# Patient Record
Sex: MALE | Race: WHITE | Employment: FULL TIME | URBAN - METROPOLITAN AREA
[De-identification: names, ages, dates, MRNs, and addresses within clinical notes are randomized per-mention and may not be internally consistent; named-entity substitution may affect disease eponyms.]

---

## 2017-11-08 PROBLEM — N48.6 PEYRONIE'S DISEASE: Status: ACTIVE | Noted: 2017-11-08

## 2018-01-11 PROBLEM — N50.89 EPIDIDYMAL MASS: Status: ACTIVE | Noted: 2018-01-11

## 2018-01-20 PROCEDURE — 82533 TOTAL CORTISOL: CPT | Performed by: INTERNAL MEDICINE

## 2018-01-22 PROCEDURE — 82384 ASSAY THREE CATECHOLAMINES: CPT | Performed by: INTERNAL MEDICINE

## 2018-01-22 PROCEDURE — 81003 URINALYSIS AUTO W/O SCOPE: CPT | Performed by: INTERNAL MEDICINE

## 2018-01-22 PROCEDURE — 87086 URINE CULTURE/COLONY COUNT: CPT | Performed by: INTERNAL MEDICINE

## 2018-01-22 PROCEDURE — 36415 COLL VENOUS BLD VENIPUNCTURE: CPT | Performed by: INTERNAL MEDICINE

## 2018-01-22 PROCEDURE — 82570 ASSAY OF URINE CREATININE: CPT | Performed by: INTERNAL MEDICINE

## 2019-01-18 PROBLEM — M54.50 CHRONIC BILATERAL LOW BACK PAIN WITHOUT SCIATICA: Status: ACTIVE | Noted: 2019-01-18

## 2019-01-18 PROBLEM — G89.29 CHRONIC BILATERAL LOW BACK PAIN WITHOUT SCIATICA: Status: ACTIVE | Noted: 2019-01-18

## 2019-05-15 PROBLEM — N43.42 SPERMATOCELE OF EPIDIDYMIS, MULTIPLE: Status: ACTIVE | Noted: 2019-05-15

## 2020-07-23 PROBLEM — M54.16 ACUTE RIGHT LUMBAR RADICULOPATHY: Status: ACTIVE | Noted: 2020-07-23

## 2021-07-21 PROBLEM — N50.3 EPIDIDYMAL CYST: Status: ACTIVE | Noted: 2018-01-11

## 2021-07-21 PROBLEM — L72.9 SCROTAL CYST: Status: ACTIVE | Noted: 2021-07-21

## 2021-09-03 PROBLEM — M54.16 ACUTE RIGHT LUMBAR RADICULOPATHY: Status: RESOLVED | Noted: 2020-07-23 | Resolved: 2021-09-03

## 2021-11-15 PROBLEM — Z87.19 HISTORY OF DIVERTICULITIS: Status: ACTIVE | Noted: 2021-11-15

## 2021-11-15 PROBLEM — R10.32 LEFT LOWER QUADRANT ABDOMINAL PAIN: Status: ACTIVE | Noted: 2021-11-15

## 2023-02-28 ENCOUNTER — LAB ENCOUNTER (OUTPATIENT)
Dept: LAB | Age: 63
End: 2023-02-28
Attending: ORTHOPAEDIC SURGERY
Payer: COMMERCIAL

## 2023-02-28 ENCOUNTER — LAB ENCOUNTER (OUTPATIENT)
Dept: LAB | Facility: REFERENCE LAB | Age: 63
End: 2023-02-28
Attending: ORTHOPAEDIC SURGERY
Payer: COMMERCIAL

## 2023-02-28 DIAGNOSIS — Z01.818 PREOPERATIVE TESTING: ICD-10-CM

## 2023-02-28 LAB
ANTIBODY SCREEN: NEGATIVE
RH BLOOD TYPE: NEGATIVE
RH BLOOD TYPE: NEGATIVE

## 2023-02-28 PROCEDURE — 86901 BLOOD TYPING SEROLOGIC RH(D): CPT

## 2023-02-28 PROCEDURE — 86900 BLOOD TYPING SEROLOGIC ABO: CPT

## 2023-02-28 PROCEDURE — 86850 RBC ANTIBODY SCREEN: CPT

## 2023-02-28 RX ORDER — LORATADINE 10 MG/1
1 CAPSULE, LIQUID FILLED ORAL DAILY
COMMUNITY

## 2023-02-28 RX ORDER — TRIAMCINOLONE ACETONIDE 55 UG/1
2 SPRAY, METERED NASAL DAILY
COMMUNITY

## 2023-03-01 PROBLEM — Z98.1 S/P LUMBAR SPINAL FUSION: Status: ACTIVE | Noted: 2023-03-01

## 2023-03-01 LAB — SARS-COV-2 RNA RESP QL NAA+PROBE: NOT DETECTED

## 2023-03-01 RX ORDER — DIAZEPAM 5 MG/ML
5 INJECTION, SOLUTION INTRAMUSCULAR; INTRAVENOUS ONCE AS NEEDED
Status: CANCELLED | OUTPATIENT
Start: 2023-03-01 | End: 2023-03-01

## 2023-03-01 RX ORDER — HYDROCODONE BITARTRATE AND ACETAMINOPHEN 10; 325 MG/1; MG/1
1 TABLET ORAL EVERY 4 HOURS PRN
Status: CANCELLED | OUTPATIENT
Start: 2023-03-01

## 2023-03-01 RX ORDER — HYDROCODONE BITARTRATE AND ACETAMINOPHEN 10; 325 MG/1; MG/1
2 TABLET ORAL EVERY 4 HOURS PRN
Status: DISCONTINUED | OUTPATIENT
Start: 2023-03-01 | End: 2023-03-05

## 2023-03-01 RX ORDER — MULTIVIT WITH MINERALS/LUTEIN
1000 TABLET ORAL 2 TIMES DAILY
Qty: 60 TABLET | Refills: 0 | Status: SHIPPED | OUTPATIENT
Start: 2023-03-01

## 2023-03-01 RX ORDER — OXYCODONE HYDROCHLORIDE AND ACETAMINOPHEN 5; 325 MG/1; MG/1
1-2 TABLET ORAL EVERY 6 HOURS PRN
Qty: 40 TABLET | Refills: 0 | Status: SHIPPED | OUTPATIENT
Start: 2023-03-01

## 2023-03-01 NOTE — DISCHARGE INSTRUCTIONS
Dr. Serena Starkey:  [id number] [address] to get wet in a shower and gently wash with soap and water two days after surgery. Change your dressing daily. When there is no drainage for two days in a row, you may discontinue the dressing. Walk as much as possible after surgery. No bending, lifting more than 10#, or twisting for 12 weeks. Wear your back brace when out of bed. Take vitamin C 1000 mg twice daily with food for 4 weeks. Take pain medication (Norco) as needed for pain, wean down as you are able. Take tizanidine (Zanaflex) for muscle spasms/pain as needed. Use miralax and colace (over the counter medications) to prevent constipation associated with the narcotic pain meds. Do not take anti-inflammatories (Advil, Motrin, Aleve, ibuprofen) for 8 weeks after surgery. It is ok to start taking prescribed aspirin or blood thinners 5 days after surgery - 3/8/2023. Call Dr. Zuhair Romo office with any concerns:  [phone]. Sometimes managing your health at home requires assistance. A representative from the home health agency will contact you or your family to schedule your first visit.        [address], [address]  Phone: [phone]  Fax: [phone]

## 2023-03-03 ENCOUNTER — APPOINTMENT (OUTPATIENT)
Dept: GENERAL RADIOLOGY | Facility: HOSPITAL | Age: 63
End: 2023-03-03
Attending: ORTHOPAEDIC SURGERY
Payer: COMMERCIAL

## 2023-03-03 ENCOUNTER — ANESTHESIA (OUTPATIENT)
Dept: SURGERY | Facility: HOSPITAL | Age: 63
End: 2023-03-03
Payer: COMMERCIAL

## 2023-03-03 ENCOUNTER — HOSPITAL ENCOUNTER (INPATIENT)
Facility: HOSPITAL | Age: 63
LOS: 2 days | Discharge: HOME | End: 2023-03-05
Attending: ORTHOPAEDIC SURGERY | Admitting: ORTHOPAEDIC SURGERY
Payer: COMMERCIAL

## 2023-03-03 ENCOUNTER — ANESTHESIA EVENT (OUTPATIENT)
Dept: SURGERY | Facility: HOSPITAL | Age: 63
End: 2023-03-03
Payer: COMMERCIAL

## 2023-03-03 DIAGNOSIS — Z01.818 PREOPERATIVE TESTING: Primary | ICD-10-CM

## 2023-03-03 LAB
AMPHET UR QL SCN: NEGATIVE
COCAINE UR QL: NEGATIVE
CREAT UR-SCNC: 251 MG/DL
HCT VFR BLD AUTO: 45.2 %
HGB BLD-MCNC: 14.9 G/DL
MDMA UR QL SCN: NEGATIVE
OXYCODONE UR QL SCN: NEGATIVE

## 2023-03-03 PROCEDURE — 80307 DRUG TEST PRSMV CHEM ANLYZR: CPT | Performed by: HOSPITALIST

## 2023-03-03 PROCEDURE — 80361 OPIATES 1 OR MORE: CPT | Performed by: HOSPITALIST

## 2023-03-03 PROCEDURE — 0SG30A0 FUSION OF LUMBOSACRAL JOINT WITH INTERBODY FUSION DEVICE, ANTERIOR APPROACH, ANTERIOR COLUMN, OPEN APPROACH: ICD-10-PCS | Performed by: ORTHOPAEDIC SURGERY

## 2023-03-03 PROCEDURE — 85014 HEMATOCRIT: CPT | Performed by: PHYSICIAN ASSISTANT

## 2023-03-03 PROCEDURE — 4A11X4G MONITORING OF PERIPHERAL NERVOUS ELECTRICAL ACTIVITY, INTRAOPERATIVE, EXTERNAL APPROACH: ICD-10-PCS | Performed by: ORTHOPAEDIC SURGERY

## 2023-03-03 PROCEDURE — 80346 BENZODIAZEPINES1-12: CPT | Performed by: HOSPITALIST

## 2023-03-03 PROCEDURE — 85018 HEMOGLOBIN: CPT | Performed by: PHYSICIAN ASSISTANT

## 2023-03-03 PROCEDURE — 80349 CANNABINOIDS NATURAL: CPT | Performed by: HOSPITALIST

## 2023-03-03 PROCEDURE — 76000 FLUOROSCOPY <1 HR PHYS/QHP: CPT | Performed by: ORTHOPAEDIC SURGERY

## 2023-03-03 PROCEDURE — 3E0T3BZ INTRODUCTION OF ANESTHETIC AGENT INTO PERIPHERAL NERVES AND PLEXI, PERCUTANEOUS APPROACH: ICD-10-PCS | Performed by: ORTHOPAEDIC SURGERY

## 2023-03-03 PROCEDURE — 0SG30K1 FUSION OF LUMBOSACRAL JOINT WITH NONAUTOLOGOUS TISSUE SUBSTITUTE, POSTERIOR APPROACH, POSTERIOR COLUMN, OPEN APPROACH: ICD-10-PCS | Performed by: ORTHOPAEDIC SURGERY

## 2023-03-03 DEVICE — SCREW ST T30  SPIN ASTRA: Type: IMPLANTABLE DEVICE | Status: FUNCTIONAL

## 2023-03-03 DEVICE — 1.3-6.0MMTICANCLCKGSCRWW/STARD: Type: IMPLANTABLE DEVICE | Status: FUNCTIONAL

## 2023-03-03 DEVICE — BONE GRAFT KIT 7510100 INFUSE X SMALL
Type: IMPLANTABLE DEVICE | Status: FUNCTIONAL
Brand: INFUSE® BONE GRAFT

## 2023-03-03 DEVICE — IMPLANTABLE DEVICE: Type: IMPLANTABLE DEVICE | Status: FUNCTIONAL

## 2023-03-03 DEVICE — 9.8-ASTRATICANNULATEDPOLY: Type: IMPLANTABLE DEVICE | Status: FUNCTIONAL

## 2023-03-03 DEVICE — 9.1-6MMTIMISFIXEDLORDOSE: Type: IMPLANTABLE DEVICE | Status: FUNCTIONAL

## 2023-03-03 DEVICE — 4.1-TIANTEGRA(TM)PLTONELVLSACR: Type: IMPLANTABLE DEVICE | Status: FUNCTIONAL

## 2023-03-03 RX ORDER — DEXAMETHASONE SODIUM PHOSPHATE 4 MG/ML
VIAL (ML) INJECTION AS NEEDED
Status: DISCONTINUED | OUTPATIENT
Start: 2023-03-03 | End: 2023-03-03 | Stop reason: SURG

## 2023-03-03 RX ORDER — ASCORBIC ACID 500 MG
1000 TABLET ORAL 2 TIMES DAILY
Status: DISCONTINUED | OUTPATIENT
Start: 2023-03-03 | End: 2023-03-05

## 2023-03-03 RX ORDER — PROCHLORPERAZINE EDISYLATE 5 MG/ML
5 INJECTION INTRAMUSCULAR; INTRAVENOUS EVERY 8 HOURS PRN
Status: DISCONTINUED | OUTPATIENT
Start: 2023-03-03 | End: 2023-03-03

## 2023-03-03 RX ORDER — ROCURONIUM BROMIDE 10 MG/ML
INJECTION, SOLUTION INTRAVENOUS AS NEEDED
Status: DISCONTINUED | OUTPATIENT
Start: 2023-03-03 | End: 2023-03-03 | Stop reason: SURG

## 2023-03-03 RX ORDER — MELATONIN
100 DAILY
Status: DISCONTINUED | OUTPATIENT
Start: 2023-03-04 | End: 2023-03-05

## 2023-03-03 RX ORDER — ONDANSETRON 2 MG/ML
4 INJECTION INTRAMUSCULAR; INTRAVENOUS EVERY 6 HOURS PRN
Status: DISCONTINUED | OUTPATIENT
Start: 2023-03-03 | End: 2023-03-03

## 2023-03-03 RX ORDER — PROCHLORPERAZINE EDISYLATE 5 MG/ML
5 INJECTION INTRAMUSCULAR; INTRAVENOUS EVERY 8 HOURS PRN
Status: DISCONTINUED | OUTPATIENT
Start: 2023-03-03 | End: 2023-03-05

## 2023-03-03 RX ORDER — DEXAMETHASONE SODIUM PHOSPHATE 4 MG/ML
10 VIAL (ML) INJECTION ONCE
Status: COMPLETED | OUTPATIENT
Start: 2023-03-04 | End: 2023-03-04

## 2023-03-03 RX ORDER — DIPHENHYDRAMINE HYDROCHLORIDE 50 MG/ML
25 INJECTION INTRAMUSCULAR; INTRAVENOUS EVERY 4 HOURS PRN
Status: DISCONTINUED | OUTPATIENT
Start: 2023-03-03 | End: 2023-03-05

## 2023-03-03 RX ORDER — OXYCODONE HYDROCHLORIDE AND ACETAMINOPHEN 5; 325 MG/1; MG/1
2 TABLET ORAL EVERY 4 HOURS PRN
Status: DISCONTINUED | OUTPATIENT
Start: 2023-03-03 | End: 2023-03-05

## 2023-03-03 RX ORDER — LORAZEPAM 1 MG/1
2 TABLET ORAL
Status: DISCONTINUED | OUTPATIENT
Start: 2023-03-03 | End: 2023-03-05

## 2023-03-03 RX ORDER — MORPHINE SULFATE 10 MG/ML
6 INJECTION, SOLUTION INTRAMUSCULAR; INTRAVENOUS EVERY 10 MIN PRN
Status: DISCONTINUED | OUTPATIENT
Start: 2023-03-03 | End: 2023-03-03 | Stop reason: HOSPADM

## 2023-03-03 RX ORDER — SODIUM CHLORIDE, SODIUM LACTATE, POTASSIUM CHLORIDE, CALCIUM CHLORIDE 600; 310; 30; 20 MG/100ML; MG/100ML; MG/100ML; MG/100ML
INJECTION, SOLUTION INTRAVENOUS CONTINUOUS
Status: DISCONTINUED | OUTPATIENT
Start: 2023-03-03 | End: 2023-03-05

## 2023-03-03 RX ORDER — LORAZEPAM 2 MG/ML
1 INJECTION INTRAMUSCULAR
Status: DISCONTINUED | OUTPATIENT
Start: 2023-03-03 | End: 2023-03-05

## 2023-03-03 RX ORDER — CEFAZOLIN SODIUM/WATER 2 G/20 ML
2 SYRINGE (ML) INTRAVENOUS EVERY 8 HOURS
Status: COMPLETED | OUTPATIENT
Start: 2023-03-03 | End: 2023-03-04

## 2023-03-03 RX ORDER — DICYCLOMINE HYDROCHLORIDE 10 MG/1
10 CAPSULE ORAL EVERY 4 HOURS PRN
Status: DISCONTINUED | OUTPATIENT
Start: 2023-03-03 | End: 2023-03-05

## 2023-03-03 RX ORDER — LIDOCAINE HYDROCHLORIDE 10 MG/ML
INJECTION, SOLUTION EPIDURAL; INFILTRATION; INTRACAUDAL; PERINEURAL AS NEEDED
Status: DISCONTINUED | OUTPATIENT
Start: 2023-03-03 | End: 2023-03-03 | Stop reason: SURG

## 2023-03-03 RX ORDER — SENNOSIDES 8.6 MG
17.2 TABLET ORAL NIGHTLY
Status: DISCONTINUED | OUTPATIENT
Start: 2023-03-03 | End: 2023-03-05

## 2023-03-03 RX ORDER — HYDROMORPHONE HYDROCHLORIDE 1 MG/ML
0.4 INJECTION, SOLUTION INTRAMUSCULAR; INTRAVENOUS; SUBCUTANEOUS EVERY 5 MIN PRN
Status: DISCONTINUED | OUTPATIENT
Start: 2023-03-03 | End: 2023-03-03 | Stop reason: HOSPADM

## 2023-03-03 RX ORDER — SODIUM PHOSPHATE, DIBASIC AND SODIUM PHOSPHATE, MONOBASIC 7; 19 G/133ML; G/133ML
1 ENEMA RECTAL ONCE AS NEEDED
Status: DISCONTINUED | OUTPATIENT
Start: 2023-03-03 | End: 2023-03-05

## 2023-03-03 RX ORDER — MULTIPLE VITAMINS W/ MINERALS TAB 9MG-400MCG
1 TAB ORAL DAILY
Status: DISCONTINUED | OUTPATIENT
Start: 2023-03-03 | End: 2023-03-05

## 2023-03-03 RX ORDER — TIZANIDINE 2 MG/1
2 TABLET ORAL EVERY 8 HOURS PRN
Status: DISCONTINUED | OUTPATIENT
Start: 2023-03-03 | End: 2023-03-05

## 2023-03-03 RX ORDER — PANTOPRAZOLE SODIUM 20 MG/1
20 TABLET, DELAYED RELEASE ORAL DAILY PRN
Status: DISCONTINUED | OUTPATIENT
Start: 2023-03-03 | End: 2023-03-05

## 2023-03-03 RX ORDER — ONDANSETRON 2 MG/ML
INJECTION INTRAMUSCULAR; INTRAVENOUS AS NEEDED
Status: DISCONTINUED | OUTPATIENT
Start: 2023-03-03 | End: 2023-03-03 | Stop reason: SURG

## 2023-03-03 RX ORDER — PROCHLORPERAZINE EDISYLATE 5 MG/ML
5 INJECTION INTRAMUSCULAR; INTRAVENOUS EVERY 8 HOURS PRN
Status: DISCONTINUED | OUTPATIENT
Start: 2023-03-03 | End: 2023-03-03 | Stop reason: HOSPADM

## 2023-03-03 RX ORDER — ONDANSETRON 2 MG/ML
4 INJECTION INTRAMUSCULAR; INTRAVENOUS EVERY 6 HOURS PRN
Status: DISCONTINUED | OUTPATIENT
Start: 2023-03-03 | End: 2023-03-03 | Stop reason: HOSPADM

## 2023-03-03 RX ORDER — ACETAMINOPHEN 500 MG
1000 TABLET ORAL ONCE
Status: COMPLETED | OUTPATIENT
Start: 2023-03-03 | End: 2023-03-03

## 2023-03-03 RX ORDER — FLUTICASONE PROPIONATE 50 MCG
2 SPRAY, SUSPENSION (ML) NASAL DAILY
Status: DISCONTINUED | OUTPATIENT
Start: 2023-03-03 | End: 2023-03-05

## 2023-03-03 RX ORDER — HYDROXYZINE HYDROCHLORIDE 25 MG/1
50 TABLET, FILM COATED ORAL EVERY 6 HOURS PRN
Status: DISCONTINUED | OUTPATIENT
Start: 2023-03-03 | End: 2023-03-05

## 2023-03-03 RX ORDER — MORPHINE SULFATE 4 MG/ML
4 INJECTION, SOLUTION INTRAMUSCULAR; INTRAVENOUS EVERY 10 MIN PRN
Status: DISCONTINUED | OUTPATIENT
Start: 2023-03-03 | End: 2023-03-03 | Stop reason: HOSPADM

## 2023-03-03 RX ORDER — CEFAZOLIN SODIUM/WATER 2 G/20 ML
2 SYRINGE (ML) INTRAVENOUS ONCE
Status: COMPLETED | OUTPATIENT
Start: 2023-03-03 | End: 2023-03-03

## 2023-03-03 RX ORDER — SODIUM CHLORIDE, SODIUM LACTATE, POTASSIUM CHLORIDE, CALCIUM CHLORIDE 600; 310; 30; 20 MG/100ML; MG/100ML; MG/100ML; MG/100ML
INJECTION, SOLUTION INTRAVENOUS CONTINUOUS
Status: DISCONTINUED | OUTPATIENT
Start: 2023-03-03 | End: 2023-03-03 | Stop reason: HOSPADM

## 2023-03-03 RX ORDER — FOLIC ACID 1 MG/1
1 TABLET ORAL DAILY
Status: DISCONTINUED | OUTPATIENT
Start: 2023-03-03 | End: 2023-03-05

## 2023-03-03 RX ORDER — SODIUM CHLORIDE 9 MG/ML
INJECTION, SOLUTION INTRAVENOUS CONTINUOUS PRN
Status: DISCONTINUED | OUTPATIENT
Start: 2023-03-03 | End: 2023-03-03 | Stop reason: SURG

## 2023-03-03 RX ORDER — MORPHINE SULFATE 4 MG/ML
2 INJECTION, SOLUTION INTRAMUSCULAR; INTRAVENOUS EVERY 10 MIN PRN
Status: DISCONTINUED | OUTPATIENT
Start: 2023-03-03 | End: 2023-03-03 | Stop reason: HOSPADM

## 2023-03-03 RX ORDER — MIDAZOLAM HYDROCHLORIDE 1 MG/ML
INJECTION INTRAMUSCULAR; INTRAVENOUS AS NEEDED
Status: DISCONTINUED | OUTPATIENT
Start: 2023-03-03 | End: 2023-03-03 | Stop reason: SURG

## 2023-03-03 RX ORDER — HYDROMORPHONE HYDROCHLORIDE 1 MG/ML
0.6 INJECTION, SOLUTION INTRAMUSCULAR; INTRAVENOUS; SUBCUTANEOUS EVERY 5 MIN PRN
Status: DISCONTINUED | OUTPATIENT
Start: 2023-03-03 | End: 2023-03-03 | Stop reason: HOSPADM

## 2023-03-03 RX ORDER — DIAZEPAM 5 MG/1
5 TABLET ORAL EVERY 6 HOURS PRN
Status: DISCONTINUED | OUTPATIENT
Start: 2023-03-03 | End: 2023-03-05

## 2023-03-03 RX ORDER — MORPHINE SULFATE 0.5 MG/ML
INJECTION, SOLUTION EPIDURAL; INTRATHECAL; INTRAVENOUS AS NEEDED
Status: DISCONTINUED | OUTPATIENT
Start: 2023-03-03 | End: 2023-03-03 | Stop reason: HOSPADM

## 2023-03-03 RX ORDER — DIPHENHYDRAMINE HCL 25 MG
25 CAPSULE ORAL EVERY 4 HOURS PRN
Status: DISCONTINUED | OUTPATIENT
Start: 2023-03-03 | End: 2023-03-05

## 2023-03-03 RX ORDER — BISACODYL 10 MG
10 SUPPOSITORY, RECTAL RECTAL
Status: DISCONTINUED | OUTPATIENT
Start: 2023-03-03 | End: 2023-03-05

## 2023-03-03 RX ORDER — SODIUM CHLORIDE 9 MG/ML
INJECTION, SOLUTION INTRAVENOUS CONTINUOUS
Status: DISCONTINUED | OUTPATIENT
Start: 2023-03-03 | End: 2023-03-05

## 2023-03-03 RX ORDER — LORAZEPAM 1 MG/1
1 TABLET ORAL
Status: DISCONTINUED | OUTPATIENT
Start: 2023-03-03 | End: 2023-03-05

## 2023-03-03 RX ORDER — NALOXONE HYDROCHLORIDE 0.4 MG/ML
80 INJECTION, SOLUTION INTRAMUSCULAR; INTRAVENOUS; SUBCUTANEOUS AS NEEDED
Status: DISCONTINUED | OUTPATIENT
Start: 2023-03-03 | End: 2023-03-03 | Stop reason: HOSPADM

## 2023-03-03 RX ORDER — BUPIVACAINE HYDROCHLORIDE AND EPINEPHRINE 5; 5 MG/ML; UG/ML
INJECTION, SOLUTION PERINEURAL AS NEEDED
Status: DISCONTINUED | OUTPATIENT
Start: 2023-03-03 | End: 2023-03-03 | Stop reason: HOSPADM

## 2023-03-03 RX ORDER — HYDROMORPHONE HYDROCHLORIDE 1 MG/ML
0.3 INJECTION, SOLUTION INTRAMUSCULAR; INTRAVENOUS; SUBCUTANEOUS
Status: DISCONTINUED | OUTPATIENT
Start: 2023-03-03 | End: 2023-03-05

## 2023-03-03 RX ORDER — DEXAMETHASONE SODIUM PHOSPHATE 4 MG/ML
10 VIAL (ML) INJECTION ONCE
Status: DISCONTINUED | OUTPATIENT
Start: 2023-03-03 | End: 2023-03-03

## 2023-03-03 RX ORDER — POLYETHYLENE GLYCOL 3350 17 G/17G
17 POWDER, FOR SOLUTION ORAL DAILY PRN
Status: DISCONTINUED | OUTPATIENT
Start: 2023-03-03 | End: 2023-03-05

## 2023-03-03 RX ORDER — ONDANSETRON 2 MG/ML
4 INJECTION INTRAMUSCULAR; INTRAVENOUS EVERY 6 HOURS PRN
Status: DISCONTINUED | OUTPATIENT
Start: 2023-03-03 | End: 2023-03-05

## 2023-03-03 RX ORDER — NALOXONE HYDROCHLORIDE 0.4 MG/ML
0.08 INJECTION, SOLUTION INTRAMUSCULAR; INTRAVENOUS; SUBCUTANEOUS
Status: DISCONTINUED | OUTPATIENT
Start: 2023-03-03 | End: 2023-03-05

## 2023-03-03 RX ORDER — EPHEDRINE SULFATE 50 MG/ML
INJECTION INTRAVENOUS AS NEEDED
Status: DISCONTINUED | OUTPATIENT
Start: 2023-03-03 | End: 2023-03-03 | Stop reason: SURG

## 2023-03-03 RX ORDER — LORAZEPAM 2 MG/ML
2 INJECTION INTRAMUSCULAR
Status: DISCONTINUED | OUTPATIENT
Start: 2023-03-03 | End: 2023-03-05

## 2023-03-03 RX ORDER — VANCOMYCIN HYDROCHLORIDE 1 G/20ML
INJECTION, POWDER, LYOPHILIZED, FOR SOLUTION INTRAVENOUS AS NEEDED
Status: DISCONTINUED | OUTPATIENT
Start: 2023-03-03 | End: 2023-03-03 | Stop reason: HOSPADM

## 2023-03-03 RX ORDER — HYDROMORPHONE HYDROCHLORIDE 1 MG/ML
0.2 INJECTION, SOLUTION INTRAMUSCULAR; INTRAVENOUS; SUBCUTANEOUS EVERY 5 MIN PRN
Status: DISCONTINUED | OUTPATIENT
Start: 2023-03-03 | End: 2023-03-03 | Stop reason: HOSPADM

## 2023-03-03 RX ORDER — OXYCODONE HYDROCHLORIDE AND ACETAMINOPHEN 5; 325 MG/1; MG/1
1 TABLET ORAL EVERY 4 HOURS PRN
Status: DISCONTINUED | OUTPATIENT
Start: 2023-03-03 | End: 2023-03-05

## 2023-03-03 RX ADMIN — SODIUM CHLORIDE, SODIUM LACTATE, POTASSIUM CHLORIDE, CALCIUM CHLORIDE: 600; 310; 30; 20 INJECTION, SOLUTION INTRAVENOUS at 11:30:00

## 2023-03-03 RX ADMIN — MIDAZOLAM HYDROCHLORIDE 2 MG: 1 INJECTION INTRAMUSCULAR; INTRAVENOUS at 08:32:00

## 2023-03-03 RX ADMIN — CEFAZOLIN SODIUM/WATER 2 G: 2 G/20 ML SYRINGE (ML) INTRAVENOUS at 08:49:00

## 2023-03-03 RX ADMIN — DEXAMETHASONE SODIUM PHOSPHATE 4 MG: 4 MG/ML VIAL (ML) INJECTION at 09:56:00

## 2023-03-03 RX ADMIN — ROCURONIUM BROMIDE 50 MG: 10 INJECTION, SOLUTION INTRAVENOUS at 08:40:00

## 2023-03-03 RX ADMIN — DEXAMETHASONE SODIUM PHOSPHATE 6 MG: 4 MG/ML VIAL (ML) INJECTION at 10:30:00

## 2023-03-03 RX ADMIN — EPHEDRINE SULFATE 5 MG: 50 INJECTION INTRAVENOUS at 11:15:00

## 2023-03-03 RX ADMIN — ROCURONIUM BROMIDE 20 MG: 10 INJECTION, SOLUTION INTRAVENOUS at 09:15:00

## 2023-03-03 RX ADMIN — SODIUM CHLORIDE: 9 INJECTION, SOLUTION INTRAVENOUS at 08:44:00

## 2023-03-03 RX ADMIN — ONDANSETRON 4 MG: 2 INJECTION INTRAMUSCULAR; INTRAVENOUS at 09:56:00

## 2023-03-03 RX ADMIN — ROCURONIUM BROMIDE 30 MG: 10 INJECTION, SOLUTION INTRAVENOUS at 08:56:00

## 2023-03-03 RX ADMIN — LIDOCAINE HYDROCHLORIDE 50 MG: 10 INJECTION, SOLUTION EPIDURAL; INFILTRATION; INTRACAUDAL; PERINEURAL at 08:38:00

## 2023-03-03 NOTE — ANESTHESIA PROCEDURE NOTES
Airway  Date/Time: 3/3/2023 8:41 AM  Urgency: Elective    Airway not difficult    General Information and Staff    Patient location during procedure: OR  Anesthesiologist: Jocelyn Carter MD  Resident/CRNA: Wenceslao Perea CRNA  Performed: CRNA   Performed by: Wenceslao Perea CRNA  Authorized by: Jocelyn Carter MD      Indications and Patient Condition  Indications for airway management: anesthesia  Sedation level: deep  Preoxygenated: yes  Patient position: sniffing  Mask difficulty assessment: 1 - vent by mask    Final Airway Details  Final airway type: endotracheal airway      Successful airway: ETT  Cuffed: yes   Successful intubation technique: Video laryngoscopy  Facilitating devices/methods: intubating stylet and cricoid pressure  Endotracheal tube insertion site: oral  Blade: GlideScope  Blade size: #3  ETT size (mm): 7.5    Cormack-Lehane Classification: grade I - full view of glottis  Placement verified by: chest auscultation, capnometry and palpation of cuff   Measured from: teeth  ETT to teeth (cm): 22  Number of attempts at approach: 1

## 2023-03-03 NOTE — OPERATIVE REPORT
[facility]    PATIENT'S NAME: Charlotte KEARNEY   ATTENDING PHYSICIAN: Anya Oneill. Ole Hill MD   OPERATING PHYSICIAN: Anya Oneill. Ole Hill MD   PATIENT ACCOUNT#:   [id number]    LOCATION:  [facility] [address] PACU [address] [place] [address]  MEDICAL RECORD #:   [id number]       YOB: 1960  ADMISSION DATE:       03/03/2023      OPERATION DATE:  03/03/2023    OPERATIVE REPORT      PREOPERATIVE DIAGNOSIS:  Degenerative disc disease, L5-S1, with retrolisthesis. POSTOPERATIVE DIAGNOSIS:  Degenerative disc disease, L5-S1, with retrolisthesis. PROCEDURE:    1. Anterior lumbar interbody fusion, L5-S1 (42602-69). 2.   DePuy ATB plate fixation system (87471, plate not connected to cage). 3.   Interbody cage, L5-S1 (independent of plate, 57936). 4.   Posterior spinal fusion, L5-S1 (56777). 5.   SpineCraft [place] pedicle screw system, L5-S1 (33576). 6.   Intrathecal morphine and fentanyl block for postoperative analgesia. CO-SURGEON:  Hermila Garza MD. (for exposure of anterior fusion and performance of interbody fusion). ASSISTANT:  Willow Hernandez PA-C (for posterior procedures, medically necessary). ANESTHESIA:  General endotracheal.    ESTIMATED BLOOD LOSS:  20 mL. DRAINS:  None. COMPLICATIONS:  None. INDICATIONS:  This is a 60-year-old male with a long history of chronic low back pain, unresponsive to conservative treatment. MRI reveals severe degenerative disc disease at L5-S1. I have recommended anterior and posterior lumbar interbody fusion, L5-S1. Risks, benefits, and alternatives were explained to the patient in detail. He appears to understand and has elected to proceed. OPERATIVE TECHNIQUE:  The patient was given uncomplicated general endotracheal anesthesia and placed supine upon the operating room table. The abdomen was prepped and draped in the usual fashion. He was given preoperative antibiotics and IV steroids. Anterior exposure was made by Dr. Hermila Garza, Department of General Surgery. This was a transverse incision approximately 4 inches below the umbilicus. Once the L5-S1 disc was exposed and the bifurcation of the great vessels and retractor holding the vessels out of the way, a needle was placed in the L5-S1 disc space and confirmed position by AP and lateral fluoroscopy. The anterior annulus was then incised with a #15 blade. The disc space was heavily curetted out. It was severely degenerative. The bone did appear to be slightly softer than normal for a male his age, but still adequate for fixation. We curetted the degenerative disc (the degeneration was severe) all the way back to the posterior annulus. We then trialed different sizes of DePuy interbody cages, finally settling on a 15 mm machined cage. We filled this with 1 sponge of Infuse and also MagnetOs calcium graft. This was driven into place and additional MagnetOs graft placed in front of the cage. A 41 mm DePuy titanium ATB plate was then affixed to the spine using 26 mm screws. Fixation was excellent. Final x-ray showed excellent placement of the plate and cage at W4-J7 with excellent restoration of normal disc height and excellent re-creation of normal lumbar lordosis. Final tightening performed on every screw. No complications. Closure was made uneventfully by Dr. Charis Reardon. Once the anterior incision was closed, a sterile dry dressing was applied. The patient was then placed prone on a Bryant frame for the remainder of the procedure. The face and extremities were well padded. The back was prepped and draped in the usual fashion. Using multiplanar fluoroscopy, two 1-inch incisions were made approximately 3.5 cm lateral to midline for a standard Dre approach down to the L5-S1 facet joints. These were decorticated using a narrow Le[place]ell. These were packed with MagnetOs graft, and then we placed Jamshidi needles in the L5 and S1 pedicles under direct fluoroscopy.   Each pedicle tapped with triggered EMGs and noted to be within threshold limits. SpineCraft [place] pedicle screws were then placed at L5 and S1 with adequate fixation. All screws tested with triggered EMGs and noted again to be within threshold limits. Contoured rods were then tightened down. Final torque wrench applied to every set screw. Final x-ray shows showed excellent placement of the pedicle screws in the anterior plate and anterior cage. An intrathecal morphine and fentanyl block was performed with a 27-gauge Sprotte needle, 0.5 mg morphine and 25 mcg fentanyl, for postop analgesia. The incisions were irrigated and closed over vancomycin powder, 500 mg, bilaterally. Subcutaneous tissue closed with 2-0 PDS. Skin closed with 4-0 Monocryl and Dermabond. A sterile dry dressing was applied. The patient tolerated the procedure well, was extubated and taken to the recovery room with stable blood pressure and pulse. There were no complications. SCDs and TEDs were used throughout the case. SSEP was unchanged during the entire procedure. Dictated By Jose Lott MD  d: 03/03/2023 11:53:21  t: 03/03/2023 13:44:15  [facility] [id number]  TOÑO/

## 2023-03-03 NOTE — PLAN OF CARE
Problem: PAIN - ADULT  Goal: Verbalizes/displays adequate comfort level or patient's stated pain goal  Description: INTERVENTIONS:  - Encourage pt to monitor pain and request assistance  - Assess pain using appropriate pain scale  - Administer analgesics based on type and severity of pain and evaluate response  - Implement non-pharmacological measures as appropriate and evaluate response  - Consider cultural and social influences on pain and pain management  - Manage/alleviate anxiety  - Utilize distraction and/or relaxation techniques  - Monitor for opioid side effects  - Notify MD/LIP if interventions unsuccessful or patient reports new pain  - Anticipate increased pain with activity and pre-medicate as appropriate  Outcome: Progressing     Problem: SAFETY ADULT - FALL  Goal: Free from fall injury  Description: INTERVENTIONS:  - Assess pt frequently for physical needs  - Identify cognitive and physical deficits and behaviors that affect risk of falls.   - [place] fall precautions as indicated by assessment.  - Educate pt/family on patient safety including physical limitations  - Instruct pt to call for assistance with activity based on assessment  - Modify environment to reduce risk of injury  - Provide assistive devices as appropriate  - Consider OT/PT consult to assist with strengthening/mobility  - Encourage toileting schedule  Outcome: Progressing     Problem: DISCHARGE PLANNING  Goal: Discharge to home or other facility with appropriate resources  Description: INTERVENTIONS:  - Identify barriers to discharge w/pt and caregiver  - Include patient/family/discharge partner in discharge planning  - Arrange for needed discharge resources and transportation as appropriate  - Identify discharge learning needs (meds, wound care, etc)  - Arrange for interpreters to assist at discharge as needed  - Consider post-discharge preferences of patient/family/discharge partner  - Complete POLST form as appropriate  - Assess patient's ability to be responsible for managing their own health  - Refer to Case Management Department for coordinating discharge planning if the patient needs post-hospital services based on physician/LIP order or complex needs related to functional status, cognitive ability or social support system  Outcome: Progressing     Problem: DRUG ABUSE/DETOX  Goal: Will have no detox symptoms and will verbalize plan for changing drug-related behavior  Description: INTERVENTIONS:  - Administer medication as ordered  - Monitor physical status  - Provide emotional support with 1:1 interaction with staff  - Encourage 12-Step involvement or recovery focused alternative  Outcome: Progressing     Problem: SKIN/TISSUE INTEGRITY - ADULT  Goal: Incision(s), wounds(s) or drain site(s) healing without S/S of infection  Description: INTERVENTIONS:  - Assess and document risk factors for pressure ulcer development  - Assess and document skin integrity  - Assess and document dressing/incision, wound bed, drain sites and surrounding tissue  - Implement wound care per orders  - Initiate isolation precautions as appropriate  - Initiate Pressure Ulcer prevention bundle as indicated  Outcome: Progressing     Problem: MUSCULOSKELETAL - ADULT  Goal: Maintain proper alignment of affected body part  Description: INTERVENTIONS:  - Support and protect limb and body alignment per provider's orders  - Instruct and reinforce with patient and family use of appropriate assistive device and precautions (e.g. spinal or hip dislocation precautions)  Outcome: Progressing    Received pt from recovery room alert and oriented. Dressing to anterior lower abdomen and lower back clean, dry and intact. Pt's diaphoretic. Nausea reported. VSS. Patient stated that his last drink was 2 weeks ago and did marijuana yesterday. Md fitzpatrick and GREG started. Medicated for c/o severe headache. Ice gel to lower back maintained. Dudley cath in place.  Clear liquid diet until BS present and (+) flatus. Fall precautions in place.

## 2023-03-03 NOTE — OPERATIVE REPORT
Pre-postop dx:  Lumbar DDD L5-S1  Proc:  alif L5-S1 with depuy plate, cage, PSF Q0-C8 with spinecraft char[place] screw system, intrathecal block, Magnetos graft  Carlos/Rani (for alif) and BRII Muñiz (posterior procedures)  geta   Ebl: 20 ml  Drains: none  Complications;  None  To RR stable  #[id number]

## 2023-03-04 PROCEDURE — 97530 THERAPEUTIC ACTIVITIES: CPT

## 2023-03-04 PROCEDURE — 97166 OT EVAL MOD COMPLEX 45 MIN: CPT

## 2023-03-04 PROCEDURE — 97162 PT EVAL MOD COMPLEX 30 MIN: CPT

## 2023-03-04 PROCEDURE — 97535 SELF CARE MNGMENT TRAINING: CPT

## 2023-03-04 NOTE — PLAN OF CARE
Pt is A&Ox4. RA. CIWA scale. Remote tele. IVF running. Tolerating clear liquid diet. Ice therapy. Voiding via joseph. PRN percocet & Ativan for pain and agitation. Ice gel theraphy. Call light within reach, frequent rounding. Safety measures in place.      Problem: Patient Centered Care  Goal: Patient preferences are identified and integrated in the patient's plan of care  Description: Interventions:  - What would you like us to know as we care for you?   - Provide timely, complete, and accurate information to patient/family  - Incorporate patient and family knowledge, values, beliefs, and cultural backgrounds into the planning and delivery of care  - Encourage patient/family to participate in care and decision-making at the level they choose  - Honor patient and family perspectives and choices  Outcome: Progressing     Problem: Patient/Family Goals  Goal: Patient/Family Long Term Goal  Description: Patient's Long Term Goal:     Interventions:  -   - See additional Care Plan goals for specific interventions  Outcome: Progressing  Goal: Patient/Family Short Term Goal  Description: Patient's Short Term Goal:     Interventio  - See additional Care Plan goals for specific interventions  Outcome: Progressing     Problem: PAIN - ADULT  Goal: Verbalizes/displays adequate comfort level or patient's stated pain goal  Description: INTERVENTIONS:  - Encourage pt to monitor pain and request assistance  - Assess pain using appropriate pain scale  - Administer analgesics based on type and severity of pain and evaluate response  - Implement non-pharmacological measures as appropriate and evaluate response  - Consider cultural and social influences on pain and pain management  - Manage/alleviate anxiety  - Utilize distraction and/or relaxation techniques  - Monitor for opioid side effects  - Notify MD/LIP if interventions unsuccessful or patient reports new pain  - Anticipate increased pain with activity and pre-medicate as appropriate  Outcome: Progressing     Problem: RISK FOR INFECTION - ADULT  Goal: Absence of fever/infection during anticipated neutropenic period  Description: INTERVENTIONS  - Monitor WBC  - Administer growth factors as ordered  - Implement neutropenic guidelines  Outcome: Progressing     Problem: SAFETY ADULT - FALL  Goal: Free from fall injury  Description: INTERVENTIONS:  - Assess pt frequently for physical needs  - Identify cognitive and physical deficits and behaviors that affect risk of falls.   - [place] fall precautions as indicated by assessment.  - Educate pt/family on patient safety including physical limitations  - Instruct pt to call for assistance with activity based on assessment  - Modify environment to reduce risk of injury  - Provide assistive devices as appropriate  - Consider OT/PT consult to assist with strengthening/mobility  - Encourage toileting schedule  Outcome: Progressing     Problem: DISCHARGE PLANNING  Goal: Discharge to home or other facility with appropriate resources  Description: INTERVENTIONS:  - Identify barriers to discharge w/pt and caregiver  - Include patient/family/discharge partner in discharge planning  - Arrange for needed discharge resources and transportation as appropriate  - Identify discharge learning needs (meds, wound care, etc)  - Arrange for interpreters to assist at discharge as needed  - Consider post-discharge preferences of patient/family/discharge partner  - Complete POLST form as appropriate  - Assess patient's ability to be responsible for managing their own health  - Refer to Case Management Department for coordinating discharge planning if the patient needs post-hospital services based on physician/LIP order or complex needs related to functional status, cognitive ability or social support system  Outcome: Progressing     Problem: DRUG ABUSE/DETOX  Goal: Will have no detox symptoms and will verbalize plan for changing drug-related behavior  Description: INTERVENTIONS:  - Administer medication as ordered  - Monitor physical status  - Provide emotional support with 1:1 interaction with staff  - Encourage 12-Step involvement or recovery focused alternative  Outcome: Progressing     Problem: SKIN/TISSUE INTEGRITY - ADULT  Goal: Incision(s), wounds(s) or drain site(s) healing without S/S of infection  Description: INTERVENTIONS:  - Assess and document risk factors for pressure ulcer development  - Assess and document skin integrity  - Assess and document dressing/incision, wound bed, drain sites and surrounding tissue  - Implement wound care per orders  - Initiate isolation precautions as appropriate  - Initiate Pressure Ulcer prevention bundle as indicated  Outcome: Progressing     Problem: MUSCULOSKELETAL - ADULT  Goal: Maintain proper alignment of affected body part  Description: INTERVENTIONS:  - Support and protect limb and body alignment per provider's orders  - Instruct and reinforce with patient and family use of appropriate assistive device and precautions (e.g. spinal or hip dislocation precautions)  Outcome: Progressing

## 2023-03-04 NOTE — PLAN OF CARE
Patient has been ambulating 1 assist w/walker. Has LSO brace in place when out of bed. Pain is being controlled w/ percocet. Is on CIWA protocol. Pt very agitated/anxious this AM but has been better this afternoon. Is passing gas/belching so diet has been advanced to full liquid. Has been nauseated sow as given IV zofran. Is a check void this afternoon, joseph removed at 1200 per orders. Has TEDs and SCDs while in bed for dvt prophylaxis. Plan is for pt to dc home w/ spouse once cleared. Problem: Patient Centered Care  Goal: Patient preferences are identified and integrated in the patient's plan of care  Description: Interventions:  - What would you like us to know as we care for you?  I live at home w/ my wife   - Provide timely, complete, and accurate information to patient/family  - Incorporate patient and family knowledge, values, beliefs, and cultural backgrounds into the planning and delivery of care  - Encourage patient/family to participate in care and decision-making at the level they choose  - Honor patient and family perspectives and choices  Outcome: Progressing     Problem: Patient/Family Goals  Goal: Patient/Family Long Term Goal  Description: Patient's Long Term Goal: To be able to ambulate w/ pain level <5    Interventions:  - Pain medications  - walker  - ambulation  - See additional Care Plan goals for specific interventions  Outcome: Progressing  Goal: Patient/Family Short Term Goal  Description: Patient's Short Term Goal: to go home     Interventions:   - follow plan of care  - See additional Care Plan goals for specific interventions  Outcome: Progressing     Problem: PAIN - ADULT  Goal: Verbalizes/displays adequate comfort level or patient's stated pain goal  Description: INTERVENTIONS:  - Encourage pt to monitor pain and request assistance  - Assess pain using appropriate pain scale  - Administer analgesics based on type and severity of pain and evaluate response  - Implement non-pharmacological measures as appropriate and evaluate response  - Consider cultural and social influences on pain and pain management  - Manage/alleviate anxiety  - Utilize distraction and/or relaxation techniques  - Monitor for opioid side effects  - Notify MD/LIP if interventions unsuccessful or patient reports new pain  - Anticipate increased pain with activity and pre-medicate as appropriate  Outcome: Progressing     Problem: RISK FOR INFECTION - ADULT  Goal: Absence of fever/infection during anticipated neutropenic period  Description: INTERVENTIONS  - Monitor WBC  - Administer growth factors as ordered  - Implement neutropenic guidelines  Outcome: Progressing     Problem: SAFETY ADULT - FALL  Goal: Free from fall injury  Description: INTERVENTIONS:  - Assess pt frequently for physical needs  - Identify cognitive and physical deficits and behaviors that affect risk of falls.   - [place] fall precautions as indicated by assessment.  - Educate pt/family on patient safety including physical limitations  - Instruct pt to call for assistance with activity based on assessment  - Modify environment to reduce risk of injury  - Provide assistive devices as appropriate  - Consider OT/PT consult to assist with strengthening/mobility  - Encourage toileting schedule  Outcome: Progressing     Problem: DISCHARGE PLANNING  Goal: Discharge to home or other facility with appropriate resources  Description: INTERVENTIONS:  - Identify barriers to discharge w/pt and caregiver  - Include patient/family/discharge partner in discharge planning  - Arrange for needed discharge resources and transportation as appropriate  - Identify discharge learning needs (meds, wound care, etc)  - Arrange for interpreters to assist at discharge as needed  - Consider post-discharge preferences of patient/family/discharge partner  - Complete POLST form as appropriate  - Assess patient's ability to be responsible for managing their own health  - Refer to Case Management Department for coordinating discharge planning if the patient needs post-hospital services based on physician/LIP order or complex needs related to functional status, cognitive ability or social support system  Outcome: Progressing     Problem: DRUG ABUSE/DETOX  Goal: Will have no detox symptoms and will verbalize plan for changing drug-related behavior  Description: INTERVENTIONS:  - Administer medication as ordered  - Monitor physical status  - Provide emotional support with 1:1 interaction with staff  - Encourage 12-Step involvement or recovery focused alternative  Outcome: Progressing     Problem: SKIN/TISSUE INTEGRITY - ADULT  Goal: Incision(s), wounds(s) or drain site(s) healing without S/S of infection  Description: INTERVENTIONS:  - Assess and document risk factors for pressure ulcer development  - Assess and document skin integrity  - Assess and document dressing/incision, wound bed, drain sites and surrounding tissue  - Implement wound care per orders  - Initiate isolation precautions as appropriate  - Initiate Pressure Ulcer prevention bundle as indicated  Outcome: Progressing     Problem: MUSCULOSKELETAL - ADULT  Goal: Maintain proper alignment of affected body part  Description: INTERVENTIONS:  - Support and protect limb and body alignment per provider's orders  - Instruct and reinforce with patient and family use of appropriate assistive device and precautions (e.g. spinal or hip dislocation precautions)  Outcome: Progressing

## 2023-03-05 VITALS
HEART RATE: 81 BPM | DIASTOLIC BLOOD PRESSURE: 84 MMHG | SYSTOLIC BLOOD PRESSURE: 136 MMHG | RESPIRATION RATE: 18 BRPM | WEIGHT: 186 LBS | OXYGEN SATURATION: 97 % | HEIGHT: 73 IN | TEMPERATURE: 98 F | BODY MASS INDEX: 24.65 KG/M2

## 2023-03-05 PROCEDURE — 97530 THERAPEUTIC ACTIVITIES: CPT

## 2023-03-05 PROCEDURE — 97110 THERAPEUTIC EXERCISES: CPT

## 2023-03-05 PROCEDURE — 97116 GAIT TRAINING THERAPY: CPT

## 2023-03-05 RX ORDER — ASPIRIN 81 MG/1
81 TABLET ORAL DAILY
Refills: 0 | Status: SHIPPED | COMMUNITY
Start: 2023-03-08

## 2023-03-05 NOTE — PHYSICAL THERAPY NOTE
PHYSICAL THERAPY TREATMENT NOTE - INPATIENT     Room Number: 434/434-A       Presenting Problem: L5/S1 fusion    Problem List  Active Problems:    S/P lumbar spinal fusion      PHYSICAL THERAPY ASSESSMENT   Chart reviewed. MEHREEN Cash approved participation in physical therapy. PPE worn by therapist: mask and gloves. Patient was wearing a mask during session. Patient presented in bedside chair and alarm activated with 6/10 pain. Patient with good  progress towards goals during this session. Education provided on Spine precautions, Physical therapy plan of care, physiological benefits of out of bed mobility and proper body mechaincs follow BLT; improtance of wearing a brace. . Patient with good carryover. Patient sitting in bedside chair no LSO. educated and assist to put brace on. Patient refused to practice stairs and state he has ramp for his dogs, he done on his own. Instructed on safety stairs navigation to the house and stay on first floor. Patient c/o of inc pain on L side of abdomen during session. He also req some encouragement to participate with his therapy activity. He took LSO off when he back to his room and sit in chair, state I don't needed now. Bed mobility: NT  Transfers: Supervision  Gait Assistance: Contact guard assist  Distance (ft): 300  Assistive Device: Rolling walker  Pattern: Shuffle       Patient was left in bedside chair at end of session with all needs in reach. The patient's Approx Degree of Impairment: 28.97% has been calculated based on documentation in the [facility] '6 clicks' Inpatient Basic Mobility Short Form. Research supports that patients with this level of impairment may benefit from Home with no services. Wife Anand Mckenzie assist at home, needs RW to take home. RN aware of patient status post session. DISCHARGE RECOMMENDATIONS  PT Discharge Recommendations: Home with home health PT     PLAN  PT Treatment Plan: Body mechanics; Endurance;Gait training;Balance training;Transfer training;Stair training    SUBJECTIVE  I want to see my doctor. OBJECTIVE  Precautions: Spine;Lumbar brace    WEIGHT BEARING RESTRICTION                   PAIN ASSESSMENT   Ratin  Location: left side abd; 3/10 back  Management Techniques: Body mechanics; Activity promotion;Repositioning    BALANCE                                                                                                                       Static Sitting: Good  Dynamic Sitting: Good           Static Standing: Fair +  Dynamic Standing: Fair    ACTIVITY TOLERANCE                         O2 WALK       AM-PAC '6-Clicks' INPATIENT SHORT FORM - BASIC MOBILITY  How much difficulty does the patient currently have. .. Patient Difficulty: Turning over in bed (including adjusting bedclothes, sheets and blankets)?: None   Patient Difficulty: Sitting down on and standing up from a chair with arms (e.g., wheelchair, bedside commode, etc.): None   Patient Difficulty: Moving from lying on back to sitting on the side of the bed?: A Little   How much help from another person does the patient currently need. .. Help from Another: Moving to and from a bed to a chair (including a wheelchair)?: None   Help from Another: Need to walk in hospital room?: A Little   Help from Another: Climbing 3-5 steps with a railing?: A Little     AM-PAC Score:  Raw Score: 21   Approx Degree of Impairment: 28.97%   Standardized Score (AM-PAC Scale): 50.25   CMS Modifier (G-Code): CJ      Additional information:     THERAPEUTIC EXERCISES  Lower Extremity Alternating marching  Ankle pumps  Heel slides  Knee extension  Quad sets     Position Sitting       Patient End of Session: Up in chair; All patient questions and concerns addressed;RN aware of session/findings;Bracing education provided per handout;Call light within reach; Needs met    CURRENT GOALS     Goals to be met by: 3/15/2023  Patient Goal Patient's self-stated goal is: to go home   Goal #1 Patient is able to demonstrate supine - sit EOB @ level: independent     Goal #1   Current Status NT   Goal #2 Patient is able to demonstrate transfers EOB to/from [facility] at assistance level: independent with none     Goal #2  Current Status supervision   Goal #3 Patient is able to ambulate 400 feet with assist device: none at assistance level: independent   Goal #3   Current Status CGA  ft   Goal #4 Patient will negotiate 3 stairs/one curb w/ assistive device and supervision   Goal #4   Current Status refused   Goal #5 Patient to demonstrate independence with home activity/exercise instructions provided to patient in preparation for discharge.    Goal #5   Current Status In progress   Goal #6    Goal #6  Current Status      PT Session Time: 35 minutes  Gait Training: 15 minutes  Therapeutic Activity: 10 minutes  Neuromuscular Re-education:  minutes  Therapeutic Exercise: 10 minutes   [place] Repositioning:  minutes  Manual Therapy:  minutes  Can add/delete any of these

## 2023-03-05 NOTE — CM/SW NOTE
Addendum 1:43:  Augustine Lima made to the only accepting [facility] provider below    [address]  [address]  Phone: [phone]  Fax: [phone]    PT AVS updated to reflect above. [facility] agency made aware that pt has a discharge order and to follow up in the community for [facility]. Addendum 11:30:  Pt RN states that pt now wants [facility] services. SW uploaded referral/F2F via [employer]. Will need acceptance/choice. MDO to SW for discharge planning. SW spoke to pt and discussed PT recommendation for [facility]. Pt refusing [facility] services at discharge. SW/CM to remain available for support and/or discharge planning.      Fabiana Bennett, KARISSA, [facility]  Social Work   EUD:#[phone]

## 2023-03-05 NOTE — PLAN OF CARE
Patient has been ambulating 1 assist w/walker. Has LSO brace in place when out of bed. Pain is being controlled w/ percocet. Is on CIWA protocol. Is passing gas/belching so diet has been advanced to general. Is voiding using urinal, was given miralax and prune juice to help w/ constipation. Has TEDs and SCDs while in bed for dvt prophylaxis. Plan is for pt to dc home w/ hhc and spouse once cleared. Rx and dc instructions provided. Problem: Patient Centered Care  Goal: Patient preferences are identified and integrated in the patient's plan of care  Description: Interventions:  - What would you like us to know as we care for you?  I live w/ my wife   - Provide timely, complete, and accurate information to patient/family  - Incorporate patient and family knowledge, values, beliefs, and cultural backgrounds into the planning and delivery of care  - Encourage patient/family to participate in care and decision-making at the level they choose  - Honor patient and family perspectives and choices  Outcome: Adequate for Discharge     Problem: Patient/Family Goals  Goal: Patient/Family Long Term Goal  Description: Patient's Long Term Goal: To be able to ambulate w/ pain level <5    Interventions:  - Pain medications   - ambulation  - walker   - See additional Care Plan goals for specific interventions  Outcome: Adequate for Discharge  Goal: Patient/Family Short Term Goal  Description: Patient's Short Term Goal: to go home     Interventions:   - follow plan of care  - See additional Care Plan goals for specific interventions  Outcome: Adequate for Discharge     Problem: PAIN - ADULT  Goal: Verbalizes/displays adequate comfort level or patient's stated pain goal  Description: INTERVENTIONS:  - Encourage pt to monitor pain and request assistance  - Assess pain using appropriate pain scale  - Administer analgesics based on type and severity of pain and evaluate response  - Implement non-pharmacological measures as appropriate and evaluate response  - Consider cultural and social influences on pain and pain management  - Manage/alleviate anxiety  - Utilize distraction and/or relaxation techniques  - Monitor for opioid side effects  - Notify MD/LIP if interventions unsuccessful or patient reports new pain  - Anticipate increased pain with activity and pre-medicate as appropriate  Outcome: Adequate for Discharge     Problem: RISK FOR INFECTION - ADULT  Goal: Absence of fever/infection during anticipated neutropenic period  Description: INTERVENTIONS  - Monitor WBC  - Administer growth factors as ordered  - Implement neutropenic guidelines  Outcome: Adequate for Discharge     Problem: SAFETY ADULT - FALL  Goal: Free from fall injury  Description: INTERVENTIONS:  - Assess pt frequently for physical needs  - Identify cognitive and physical deficits and behaviors that affect risk of falls.   - [place] fall precautions as indicated by assessment.  - Educate pt/family on patient safety including physical limitations  - Instruct pt to call for assistance with activity based on assessment  - Modify environment to reduce risk of injury  - Provide assistive devices as appropriate  - Consider OT/PT consult to assist with strengthening/mobility  - Encourage toileting schedule  Outcome: Adequate for Discharge     Problem: DISCHARGE PLANNING  Goal: Discharge to home or other facility with appropriate resources  Description: INTERVENTIONS:  - Identify barriers to discharge w/pt and caregiver  - Include patient/family/discharge partner in discharge planning  - Arrange for needed discharge resources and transportation as appropriate  - Identify discharge learning needs (meds, wound care, etc)  - Arrange for interpreters to assist at discharge as needed  - Consider post-discharge preferences of patient/family/discharge partner  - Complete POLST form as appropriate  - Assess patient's ability to be responsible for managing their own health  - Refer to Case Management Department for coordinating discharge planning if the patient needs post-hospital services based on physician/LIP order or complex needs related to functional status, cognitive ability or social support system  Outcome: Adequate for Discharge     Problem: DRUG ABUSE/DETOX  Goal: Will have no detox symptoms and will verbalize plan for changing drug-related behavior  Description: INTERVENTIONS:  - Administer medication as ordered  - Monitor physical status  - Provide emotional support with 1:1 interaction with staff  - Encourage 12-Step involvement or recovery focused alternative  Outcome: Adequate for Discharge     Problem: SKIN/TISSUE INTEGRITY - ADULT  Goal: Incision(s), wounds(s) or drain site(s) healing without S/S of infection  Description: INTERVENTIONS:  - Assess and document risk factors for pressure ulcer development  - Assess and document skin integrity  - Assess and document dressing/incision, wound bed, drain sites and surrounding tissue  - Implement wound care per orders  - Initiate isolation precautions as appropriate  - Initiate Pressure Ulcer prevention bundle as indicated  Outcome: Adequate for Discharge     Problem: MUSCULOSKELETAL - ADULT  Goal: Maintain proper alignment of affected body part  Description: INTERVENTIONS:  - Support and protect limb and body alignment per provider's orders  - Instruct and reinforce with patient and family use of appropriate assistive device and precautions (e.g. spinal or hip dislocation precautions)  Outcome: Adequate for Discharge

## 2023-03-05 NOTE — PLAN OF CARE
Pt is A&Ox4. LOS brace when ambulating. RA. Remote tele. CIWA protocol. Teds & scds for dvt prophylaxis. Saline locked. Tolerating clear liquid diet. Voiding freely. Ice therapy applied. PRN& Oxycodone for pain management. Up by 1 assist with a walker. Plan when medically clear, home with HH. Call light within reach, frequent monitoring. Safety measures in place.      Problem: Patient Centered Care  Goal: Patient preferences are identified and integrated in the patient's plan of care  Description: Interventions:  - What would you like us to know as we care for you?   - Provide timely, complete, and accurate information to patient/family  - Incorporate patient and family knowledge, values, beliefs, and cultural backgrounds into the planning and delivery of care  - Encourage patient/family to participate in care and decision-making at the level they choose  - Honor patient and family perspectives and choices  Outcome: Progressing     Problem: Patient/Family Goals  Goal: Patient/Family Long Term Goal  Description: Patient's Long Term Goal:     Interventions:  -   - See additional Care Plan goals for specific interventions  Outcome: Progressing  Goal: Patient/Family Short Term Goal  Description: Patient's Short Term Goal:     Interventions:     - See additional Care Plan goals for specific interventions  Outcome: Progressing     Problem: PAIN - ADULT  Goal: Verbalizes/displays adequate comfort level or patient's stated pain goal  Description: INTERVENTIONS:  - Encourage pt to monitor pain and request assistance  - Assess pain using appropriate pain scale  - Administer analgesics based on type and severity of pain and evaluate response  - Implement non-pharmacological measures as appropriate and evaluate response  - Consider cultural and social influences on pain and pain management  - Manage/alleviate anxiety  - Utilize distraction and/or relaxation techniques  - Monitor for opioid side effects  - Notify MD/LIP if interventions unsuccessful or patient reports new pain  - Anticipate increased pain with activity and pre-medicate as appropriate  Outcome: Progressing     Problem: RISK FOR INFECTION - ADULT  Goal: Absence of fever/infection during anticipated neutropenic period  Description: INTERVENTIONS  - Monitor WBC  - Administer growth factors as ordered  - Implement neutropenic guidelines  Outcome: Progressing     Problem: SAFETY ADULT - FALL  Goal: Free from fall injury  Description: INTERVENTIONS:  - Assess pt frequently for physical needs  - Identify cognitive and physical deficits and behaviors that affect risk of falls.   - [place] fall precautions as indicated by assessment.  - Educate pt/family on patient safety including physical limitations  - Instruct pt to call for assistance with activity based on assessment  - Modify environment to reduce risk of injury  - Provide assistive devices as appropriate  - Consider OT/PT consult to assist with strengthening/mobility  - Encourage toileting schedule  Outcome: Progressing     Problem: DISCHARGE PLANNING  Goal: Discharge to home or other facility with appropriate resources  Description: INTERVENTIONS:  - Identify barriers to discharge w/pt and caregiver  - Include patient/family/discharge partner in discharge planning  - Arrange for needed discharge resources and transportation as appropriate  - Identify discharge learning needs (meds, wound care, etc)  - Arrange for interpreters to assist at discharge as needed  - Consider post-discharge preferences of patient/family/discharge partner  - Complete POLST form as appropriate  - Assess patient's ability to be responsible for managing their own health  - Refer to Case Management Department for coordinating discharge planning if the patient needs post-hospital services based on physician/LIP order or complex needs related to functional status, cognitive ability or social support system  Outcome: Progressing     Problem: DRUG ABUSE/DETOX  Goal: Will have no detox symptoms and will verbalize plan for changing drug-related behavior  Description: INTERVENTIONS:  - Administer medication as ordered  - Monitor physical status  - Provide emotional support with 1:1 interaction with staff  - Encourage 12-Step involvement or recovery focused alternative  Outcome: Progressing     Problem: SKIN/TISSUE INTEGRITY - ADULT  Goal: Incision(s), wounds(s) or drain site(s) healing without S/S of infection  Description: INTERVENTIONS:  - Assess and document risk factors for pressure ulcer development  - Assess and document skin integrity  - Assess and document dressing/incision, wound bed, drain sites and surrounding tissue  - Implement wound care per orders  - Initiate isolation precautions as appropriate  - Initiate Pressure Ulcer prevention bundle as indicated  Outcome: Progressing     Problem: MUSCULOSKELETAL - ADULT  Goal: Maintain proper alignment of affected body part  Description: INTERVENTIONS:  - Support and protect limb and body alignment per provider's orders  - Instruct and reinforce with patient and family use of appropriate assistive device and precautions (e.g. spinal or hip dislocation precautions)  Outcome: Progressing

## 2023-03-07 LAB
7-AMINOCLONAZEPAM, URINE: <5 NG/ML
ALPHA-HYDROXYALPRAZOLAM, URINE: <5 NG/ML
ALPHA-HYDROXYMIDAZOLAM, URINE: 2053 NG/ML
ALPRAZOLAM, URINE: <5 NG/ML
CHLORDIAZEPOXIDE, URINE: <20 NG/ML
CLONAZEPAM, URINE: <5 NG/ML
DIAZEPAM, URINE: <20 NG/ML
DRUG CONFIRMATION,CANNABINOIDS: 446 NG/ML
LORAZEPAM, URINE: <20 NG/ML
MIDAZOLAM, URINE: <20 NG/ML
NORDIAZEPAM, URINE: <20 NG/ML
OPIATES, UR, 6-ACETYLMORPHINE: <10 NG/ML
OPIATES, URINE, CODEINE: <20 NG/ML
OPIATES, URINE, HYDROCODONE: <20 NG/ML
OPIATES, URINE, HYDROMORPHONE: 1161 NG/ML
OPIATES, URINE, MORPHINE: 43 NG/ML
OPIATES, URINE, NORHYDROCODONE: <20 NG/ML
OPIATES, URINE, NOROXYCODONE: <20 NG/ML
OPIATES, URINE, NOROXYMORPHONE: <20 NG/ML
OPIATES, URINE, OXYCODONE: <20 NG/ML
OPIATES, URINE, OXYMORPHONE: <20 NG/ML
OXAZEPAM, URINE: <20 NG/ML
TEMAZEPAM, URINE: <20 NG/ML

## 2023-03-12 NOTE — OPERATIVE REPORT
[facility]     Operative Report    Patient Name:  Leticia Bell  MR:  [id number]  :  1960  DOS:  03/3/23    Preop Dx:    Degenerative disc disease, L5-S1, with retrolisthesis. Postop Dx:    Degenerative disc disease, L5-S1, with retrolisthesis. Procedure:    1. Anterior Lumbar Interbody Fusion L5 to S1 (42476-26)  2. Anterior Lumbar Instrumentation (52232-76)  3. Anterior insertion of interbody biomechanical device (89563-54)  4. Ureterolysis (84095)  5. Real-time intraoperative C-arm fluoroscopy with image guidance and surgeon interpretation (04278)    Surgeons:  Tank Tapia MD, Lesly Velasquez MD  Surgical Assistant.: Jim Moreno CSA  EBL: 50 ml  Complications:  None. Description:    The patient is a 58year old male with Degenerative disc disease, spondylolisthesis lumbar 5 - sacral 1 who is undergoing an ALIF with Dr. Lesly Velasquez. General surgery was asked to assist in exposing the anterior disc space. The patient was taken to the OR and placed in supine position. General anesthesia was established and the abdomen was prepped in standard fashion. Fluoroscopy was used to aramis the L5-S1 level. A transverse incision was made over the left lower quadrant. The anterior left rectus fascia was incised and the left rectus muscle was mobilized medially. A vertical incision was made in the tranversalis fascia and the retroperitoneum was entered. The peritoneum and left ureter were identified and mobilized medially. Left ureterolysis was performed safely and no injury occurred. Just medial to the left iliac vein, we dissected the prevertebral space. The median sacral vessels were divided to further expose the L5-S1 disc space. The retractors were placed to expose the anterior disc space. Fluoroscopy was used to confirm the L5-S1 disc level. The case was turned over to Dr. Eric Skaggs and I assisted in the discectomy and implant placement.     After fluoroscopic confirmation of the implant placement, the operative field was irrigated with saline. Adequate hemostasis was obtained. The retractors were slowly removed and the operative field remained hemostatic. There were no injury to the left ureter, peritoneum or the sympathetic chain during our dissection. A left transversus abdominus plane block was performed using 30 ml of 0.5% marcaine. The anterior fascia was closed using 0 looped PDS. We closed the subcutaneous tissue and skin. All instrument and sponge counts were correct. I was present to expose the anterior disc space.     Julienne Guallpa MD

## 2023-04-21 ENCOUNTER — TELEPHONE (OUTPATIENT)
Dept: SURGERY | Facility: CLINIC | Age: 63
End: 2023-04-21

## (undated) DEVICE — DRAPE SHEET LARGE 76X55

## (undated) DEVICE — DRAPE SRG 90X60IN BCK TBL CVR

## (undated) DEVICE — SUT SILK 2-0 SA85H

## (undated) DEVICE — C-ARMOR C-ARM EQUIPMENT COVERS CLEAR STERILE UNIVERSAL FIT 12 PER CASE: Brand: C-ARMOR

## (undated) DEVICE — Device: Brand: JELCO

## (undated) DEVICE — ENCORE® LATEX MICRO SIZE 6.5, STERILE LATEX POWDER-FREE SURGICAL GLOVE: Brand: ENCORE

## (undated) DEVICE — SUT VICRYL 3-0 SH J416H

## (undated) DEVICE — NEEDLE SPINAL BD 20GX3.5

## (undated) DEVICE — DERMABOND CLOSURE 0.7ML TOPICL

## (undated) DEVICE — SNAP KOVER: Brand: UNBRANDED

## (undated) DEVICE — CONTAINER,SPECIMEN,OR STERILE,4OZ: Brand: MEDLINE

## (undated) DEVICE — MICRO KOVER: Brand: UNBRANDED

## (undated) DEVICE — 1 ML INSULIN SYRINGE REGULAR LUER TIP: Brand: MONOJECT

## (undated) DEVICE — SUT MONOCRYL 2-0 CT-1 Y945H

## (undated) DEVICE — FLOSEAL HEMOSTATIC MATRIX, 5ML: Brand: FLOSEAL HEMOSTATIC MATRIX

## (undated) DEVICE — SUT VICRYL 1 OS-6 J535H

## (undated) DEVICE — SUT PDS II 0 CTX Z990G

## (undated) DEVICE — NEEDLE CONFIDENCE SPINAL

## (undated) DEVICE — SUT VICRYL 5-0 P-3 J493G

## (undated) DEVICE — GAUZE,SPONGE,FLUFF,6"X6.75",STRL,5/TRAY: Brand: MEDLINE

## (undated) DEVICE — TRAY SURESTEP 16 BARDEX DRAIN

## (undated) DEVICE — SUT VICRYL 2-0 CT-1 J945H

## (undated) DEVICE — ELECTRODE MEGADYNE BLADE 2.5IN

## (undated) DEVICE — WRAP COOLING BACK W/NO PILLOW

## (undated) DEVICE — SPONGE: SPECIALTY PEANUT XR 100/CS: Brand: MEDICAL ACTION INDUSTRIES

## (undated) DEVICE — PEN: MARKING STD PT 100/CS: Brand: MEDICAL ACTION INDUSTRIES

## (undated) DEVICE — 11.1-15K-WIRENITINOLW/S: Type: IMPLANTABLE DEVICE

## (undated) DEVICE — GAUZE TRAY STERILE 4X4 12PLY

## (undated) DEVICE — LAMINECTOMY: Brand: MEDLINE INDUSTRIES, INC.

## (undated) DEVICE — SOL NACL IRRIG 0.9% 1000ML BTL

## (undated) DEVICE — GAMMEX® PI HYBRID SIZE 9, STERILE POWDER-FREE SURGICAL GLOVE, POLYISOPRENE AND NEOPRENE BLEND: Brand: GAMMEX

## (undated) DEVICE — CLIP INTERNAL HORIZON MED

## (undated) DEVICE — NDL ANES 26GA 5IN SPNL PP

## (undated) DEVICE — 3.0MM PRECISION NEURO (MATCH HEAD)

## (undated) DEVICE — GOWN SURG AERO BLUE PERF XLG

## (undated) DEVICE — GAMMEX® PI HYBRID SIZE 7, STERILE POWDER-FREE SURGICAL GLOVE, POLYISOPRENE AND NEOPRENE BLEND: Brand: GAMMEX

## (undated) DEVICE — CLIP INTERNAL HORIZON SMALL

## (undated) DEVICE — APPLIER LICACLIP MCA MED 23.8

## (undated) DEVICE — Device

## (undated) DEVICE — 3 ML SYRINGE LUER-LOCK TIP: Brand: MONOJECT

## (undated) DEVICE — SPONGE PREMIERPRO 7X18X18

## (undated) DEVICE — SPONGE LAP 4X18IN 7IN LOOP

## (undated) DEVICE — CURAD NONADHERANT PAD 3X8

## (undated) DEVICE — ENDOPATH 5MM ENDOSCOPIC BLUNT TIP DISSECTORS (12 POUCHES CONTAINING 3 DISSECTORS EACH): Brand: ENDOPATH

## (undated) DEVICE — SUT MONOCRYL 4-0 PS-2 Y496G

## (undated) DEVICE — DRAPE SRG 26X15IN UTL TPE STRL

## (undated) NOTE — LETTER
[address] [place] [address]  Authorization for Surgical Operation and Procedure                                                                                           1. I hereby authorize Alanis Woody MD, Louann Lozano MD, my physician and his/her assistants (if applicable), which may include medical students, residents, and/or fellows, to perform the following surgical operation/ procedure and administer such anesthesia as may be determined necessary by my physician: Operation/Procedure name (s) Anterior lumbar interbody fusion lumbar 5 - sacral 1, plate, cage, posterior spinal fusion lumbar 5 - sacral 1, instrumentation, allograft on Lukas Villegas   2. I recognize that during the surgical operation/procedure, unforeseen conditions may necessitate additional or different procedures than those listed above. I, therefore, further authorize and request that the above-named surgeon, assistants, or designees perform such procedures as are, in their judgment, necessary and desirable. 3.   My surgeon/physician has discussed prior to my surgery the potential benefits, risks and side effects of this procedure; the likelihood of achieving goals; and potential problems that might occur during recuperation. They also discussed reasonable alternatives to the procedure, including risks, benefits, and side effects related to the alternatives and risks related to not receiving this procedure. I have had all my questions answered and I acknowledge that no guarantee has been made as to the result that may be obtained. 4.   Should the need arise during my operation/procedure, which includes change of level of care prior to discharge, I also consent to the administration of blood and/or blood products.   Further, I understand that despite careful testing and screening of blood or blood products by collecting agencies, I may still be subject to ill effects as a result of receiving a blood transfusion and/or blood products. The following are some, but not all, of the potential risks that can occur: fever and allergic reactions, hemolytic reactions, transmission of diseases such as Hepatitis, AIDS and Cytomegalovirus (CMV) and fluid overload. In the event that I wish to have an autologous transfusion of my own blood, or a directed donor transfusion, I will discuss this with my physician. Check only if Refusing Blood or Blood Products  I understand refusal of blood or blood products as deemed necessary by my physician may have serious consequences to my condition to include possible death. I hereby assume responsibility for my refusal and release the hospital, its personnel, and my physicians from any responsibility for the consequences of my refusal.    o  Refuse   5. I authorize the use of any specimen, organs, tissues, body parts or foreign objects that may be removed from my body during the operation/procedure for diagnosis, research or teaching purposes and their subsequent disposal by hospital authorities. I also authorize the release of specimen test results and/or written reports to my treating physician on the hospital medical staff or other referring or consulting physicians involved in my care, at the discretion of the Pathologist or my treating physician. 6.   I consent to the photographing or videotaping of the operations or procedures to be performed, including appropriate portions of my body for medical, scientific, or educational purposes, provided my identity is not revealed by the pictures or by descriptive texts accompanying them. If the procedure has been photographed/videotaped, the surgeon will obtain the original picture, image, videotape or CD.   The hospital will not be responsible for storage, release or maintenance of the picture, image, tape or CD.    7.   I consent to the presence of a  or observers in the operating room as deemed necessary by my physician or their designees. 8.   I recognize that in the event my procedure results in extended X-Ray/fluoroscopy time, I may develop a skin reaction. 9. If I have a Do Not Attempt Resuscitation (DNAR) order in place, that status will be suspended while in the operating room, procedural suite, and during the recovery period unless otherwise explicitly stated by me (or a person authorized to consent on my behalf). The surgeon or my attending physician will determine when the applicable recovery period ends for purposes of reinstating the DNAR order. 10. Patients having a sterilization procedure: I understand that if the procedure is successful the results will be permanent and it will therefore be impossible for me to inseminate, conceive, or bear children. I also understand that the procedure is intended to result in sterility, although the result has not been guaranteed. 11. I acknowledge that my physician has explained sedation/analgesia administration to me including the risk and benefits I consent to the administration of sedation/analgesia as may be necessary or desirable in the judgment of my physician.     I CERTIFY THAT I HAVE READ AND FULLY UNDERSTAND THE ABOVE CONSENT TO OPERATION and/or OTHER PROCEDURE.     _________________________________________ _________________________________     ___________________________________  Signature of Patient     Signature of Responsible Person                   Printed Name of Responsible Person                              _________________________________________ ______________________________        ___________________________________  Signature of Witness         Date  Time         Relationship to Patient    STATEMENT OF PHYSICIAN My signature below affirms that prior to the time of the procedure; I have explained to the patient and/or his/her legal representative, the risks and benefits involved in the proposed treatment and any reasonable alternative to the proposed treatment. I have also explained the risks and benefits involved in refusal of the proposed treatment and alternatives to the proposed treatment and have answered the patient's questions.  If I have a significant financial interest in a co-management agreement or a significant financial interest in any product or implant, or other significant relationship used in this procedure/surgery, I have disclosed this and had a discussion with my patient.     _______________________________________________________________ _____________________________  Vanessa Maldonado Physician)                                                                                         (Date)                                   (Time)  Patient Name: Angel Benton    : 1960   Printed: 3/2/2023      Medical Record #: [id number]                                              Page 1 of 1